# Patient Record
Sex: FEMALE | Race: WHITE | NOT HISPANIC OR LATINO | Employment: OTHER | URBAN - METROPOLITAN AREA
[De-identification: names, ages, dates, MRNs, and addresses within clinical notes are randomized per-mention and may not be internally consistent; named-entity substitution may affect disease eponyms.]

---

## 2023-05-19 ENCOUNTER — APPOINTMENT (OUTPATIENT)
Dept: PRIMARY CARE | Facility: CLINIC | Age: 58
End: 2023-05-19
Payer: COMMERCIAL

## 2023-07-13 ENCOUNTER — APPOINTMENT (OUTPATIENT)
Dept: PRIMARY CARE | Facility: CLINIC | Age: 58
End: 2023-07-13
Payer: COMMERCIAL

## 2023-07-17 RX ORDER — SIMVASTATIN 20 MG/1
TABLET, FILM COATED ORAL
Qty: 90 TABLET | Refills: 0 | OUTPATIENT
Start: 2023-07-17

## 2023-08-04 LAB
ALANINE AMINOTRANSFERASE (SGPT) (U/L) IN SER/PLAS: 13 U/L (ref 7–45)
ALBUMIN (G/DL) IN SER/PLAS: 4.6 G/DL (ref 3.4–5)
ALKALINE PHOSPHATASE (U/L) IN SER/PLAS: 55 U/L (ref 33–110)
ANION GAP IN SER/PLAS: 10 MMOL/L (ref 10–20)
ASPARTATE AMINOTRANSFERASE (SGOT) (U/L) IN SER/PLAS: 15 U/L (ref 9–39)
BILIRUBIN TOTAL (MG/DL) IN SER/PLAS: 0.8 MG/DL (ref 0–1.2)
CALCIUM (MG/DL) IN SER/PLAS: 9.4 MG/DL (ref 8.6–10.3)
CARBON DIOXIDE, TOTAL (MMOL/L) IN SER/PLAS: 30 MMOL/L (ref 21–32)
CHLORIDE (MMOL/L) IN SER/PLAS: 106 MMOL/L (ref 98–107)
CHOLESTEROL (MG/DL) IN SER/PLAS: 160 MG/DL (ref 0–199)
CHOLESTEROL IN HDL (MG/DL) IN SER/PLAS: 55.5 MG/DL
CHOLESTEROL/HDL RATIO: 2.9
CREATININE (MG/DL) IN SER/PLAS: 0.72 MG/DL (ref 0.5–1.05)
ESTIMATED AVERAGE GLUCOSE FOR HBA1C: 128 MG/DL
GFR FEMALE: >90 ML/MIN/1.73M2
GLUCOSE (MG/DL) IN SER/PLAS: 96 MG/DL (ref 74–99)
HEMOGLOBIN A1C/HEMOGLOBIN TOTAL IN BLOOD: 6.1 %
LDL: 76 MG/DL (ref 0–99)
POTASSIUM (MMOL/L) IN SER/PLAS: 4.5 MMOL/L (ref 3.5–5.3)
PROTEIN TOTAL: 7.4 G/DL (ref 6.4–8.2)
SODIUM (MMOL/L) IN SER/PLAS: 141 MMOL/L (ref 136–145)
TRIGLYCERIDE (MG/DL) IN SER/PLAS: 144 MG/DL (ref 0–149)
UREA NITROGEN (MG/DL) IN SER/PLAS: 22 MG/DL (ref 6–23)
VLDL: 29 MG/DL (ref 0–40)

## 2023-08-11 ENCOUNTER — OFFICE VISIT (OUTPATIENT)
Dept: PRIMARY CARE | Facility: CLINIC | Age: 58
End: 2023-08-11
Payer: COMMERCIAL

## 2023-08-11 VITALS
SYSTOLIC BLOOD PRESSURE: 146 MMHG | DIASTOLIC BLOOD PRESSURE: 84 MMHG | OXYGEN SATURATION: 99 % | WEIGHT: 174 LBS | HEART RATE: 63 BPM | TEMPERATURE: 97.3 F

## 2023-08-11 DIAGNOSIS — R73.01 IFG (IMPAIRED FASTING GLUCOSE): ICD-10-CM

## 2023-08-11 DIAGNOSIS — I10 BENIGN ESSENTIAL HTN: ICD-10-CM

## 2023-08-11 DIAGNOSIS — J30.9 ALLERGIC RHINITIS, UNSPECIFIED SEASONALITY, UNSPECIFIED TRIGGER: ICD-10-CM

## 2023-08-11 DIAGNOSIS — E78.5 HYPERLIPIDEMIA, UNSPECIFIED HYPERLIPIDEMIA TYPE: Primary | ICD-10-CM

## 2023-08-11 DIAGNOSIS — K21.9 GASTROESOPHAGEAL REFLUX DISEASE WITHOUT ESOPHAGITIS: ICD-10-CM

## 2023-08-11 PROCEDURE — 99214 OFFICE O/P EST MOD 30 MIN: CPT | Performed by: PHYSICIAN ASSISTANT

## 2023-08-11 PROCEDURE — 3077F SYST BP >= 140 MM HG: CPT | Performed by: PHYSICIAN ASSISTANT

## 2023-08-11 PROCEDURE — 3079F DIAST BP 80-89 MM HG: CPT | Performed by: PHYSICIAN ASSISTANT

## 2023-08-11 PROCEDURE — 1036F TOBACCO NON-USER: CPT | Performed by: PHYSICIAN ASSISTANT

## 2023-08-11 RX ORDER — ATENOLOL 50 MG/1
50 TABLET ORAL DAILY
Qty: 90 TABLET | Refills: 1 | Status: CANCELLED | OUTPATIENT
Start: 2023-08-11

## 2023-08-11 RX ORDER — FLUTICASONE PROPIONATE 50 MCG
2 SPRAY, SUSPENSION (ML) NASAL DAILY
Qty: 48 G | Refills: 1 | Status: SHIPPED | OUTPATIENT
Start: 2023-08-11 | End: 2023-12-08 | Stop reason: SDUPTHER

## 2023-08-11 RX ORDER — SIMVASTATIN 20 MG/1
20 TABLET, FILM COATED ORAL NIGHTLY
Qty: 90 TABLET | Refills: 1 | Status: SHIPPED | OUTPATIENT
Start: 2023-08-11 | End: 2023-12-08 | Stop reason: SDUPTHER

## 2023-08-11 RX ORDER — FLUTICASONE PROPIONATE 50 MCG
2 SPRAY, SUSPENSION (ML) NASAL DAILY
COMMUNITY
End: 2023-08-11 | Stop reason: SDUPTHER

## 2023-08-11 RX ORDER — SIMVASTATIN 20 MG/1
20 TABLET, FILM COATED ORAL NIGHTLY
COMMUNITY
End: 2023-08-11 | Stop reason: SDUPTHER

## 2023-08-11 RX ORDER — ATENOLOL 50 MG/1
50 TABLET ORAL DAILY
COMMUNITY
End: 2023-08-11 | Stop reason: DRUGHIGH

## 2023-08-11 RX ORDER — MINERAL OIL
180 ENEMA (ML) RECTAL DAILY
COMMUNITY
Start: 2007-06-26

## 2023-08-11 RX ORDER — PANTOPRAZOLE SODIUM 40 MG/1
40 TABLET, DELAYED RELEASE ORAL
Qty: 90 TABLET | Refills: 1 | Status: SHIPPED | OUTPATIENT
Start: 2023-08-11 | End: 2023-12-08 | Stop reason: SDUPTHER

## 2023-08-11 RX ORDER — ATENOLOL 100 MG/1
100 TABLET ORAL DAILY
Qty: 30 TABLET | Refills: 5 | Status: SHIPPED | OUTPATIENT
Start: 2023-08-11 | End: 2023-08-28 | Stop reason: SDUPTHER

## 2023-08-11 RX ORDER — PANTOPRAZOLE SODIUM 40 MG/1
40 TABLET, DELAYED RELEASE ORAL
COMMUNITY
End: 2023-08-11 | Stop reason: SDUPTHER

## 2023-08-11 ASSESSMENT — ENCOUNTER SYMPTOMS
SHORTNESS OF BREATH: 0
ABDOMINAL PAIN: 0

## 2023-08-11 NOTE — PROGRESS NOTES
Subjective   Patient ID: Sunita Austin is a 57 y.o. female who presents for Hypertension (Recheck. Review labs.).    HPI   Patient presents for hypertension and hyperlipidemia, allergies, and GERD.     Hyperlipidemia:  Taking simvastatin qd and fish oil qd. No medication side effects noted.   LDL (mg/dL)   Date Value   08/04/2023 76   12/21/2022 100 (H)     Triglycerides (mg/dL)   Date Value   08/04/2023 144   12/21/2022 140     HDL (mg/dL)   Date Value   08/04/2023 55.5   12/21/2022 62.4     Hypertension:  Taking atenolol 50mg qd and tolerating it well. B  Done fair with diet.  BPs have been running  a little higher.     Allergies doing ok with use of Flonase and Allegra.    IFG:  A1c allison to 6.1. Eating more crackers and more pasta.   Hemoglobin A1C (%)   Date Value   08/04/2023 6.1 (A)   12/21/2022 5.8 (A)     Glucose (mg/dL)   Date Value   08/04/2023 96   12/21/2022 100 (H)     GERD:  Protonix is controlling acid reflux well. No breakthrough reflux or abdominal pain.     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      reports that she has never smoked. She has never used smokeless tobacco.      Review of Systems   Respiratory:  Negative for shortness of breath.    Cardiovascular:  Negative for chest pain.   Gastrointestinal:  Negative for abdominal pain.       Objective   /84   Pulse 63   Temp 36.3 °C (97.3 °F)   Wt 78.9 kg (174 lb)   SpO2 99%     Physical Exam  Vitals and nursing note reviewed.   Constitutional:       Appearance: Normal appearance. She is well-developed.   Eyes:      General: No scleral icterus.  Cardiovascular:      Rate and Rhythm: Normal rate and regular rhythm.      Heart sounds: No murmur heard.  Pulmonary:      Effort: Pulmonary effort is normal.      Breath sounds: Normal breath sounds.   Abdominal:      Palpations: Abdomen is soft. There is no mass.      Tenderness: There is no abdominal tenderness.   Musculoskeletal:      Cervical back: Neck supple.   Skin:     General:  Skin is warm and dry.   Neurological:      Mental Status: She is alert.       Assessment/Plan   Diagnoses and all orders for this visit:  Hyperlipidemia, unspecified hyperlipidemia type  IFG (impaired fasting glucose)  Benign essential HTN  Gastroesophageal reflux disease without esophagitis  Allergic rhinitis, unspecified seasonality, unspecified trigger     Reviewed labs.   Discussed diet and exercise, and encouraged weight loss.   Refilled meds.   Discussed uncontrolled HTN.  Increase to Atenolol 100mg every day. Monitor pulse.   Follow up in 4 months for recheck HTN, hyperlipidemia, IFG, GERD with fasting labs the week before.

## 2023-08-28 ENCOUNTER — TELEPHONE (OUTPATIENT)
Dept: PRIMARY CARE | Facility: CLINIC | Age: 58
End: 2023-08-28
Payer: COMMERCIAL

## 2023-08-28 DIAGNOSIS — I10 BENIGN ESSENTIAL HTN: Primary | ICD-10-CM

## 2023-08-28 RX ORDER — LISINOPRIL 10 MG/1
10 TABLET ORAL DAILY
Qty: 30 TABLET | Refills: 3 | Status: SHIPPED | OUTPATIENT
Start: 2023-08-28 | End: 2023-12-08 | Stop reason: DRUGHIGH

## 2023-08-28 RX ORDER — ATENOLOL 100 MG/1
50 TABLET ORAL DAILY
Qty: 15 TABLET | Refills: 5
Start: 2023-08-28 | End: 2023-12-08 | Stop reason: DRUGHIGH

## 2023-08-28 NOTE — TELEPHONE ENCOUNTER
Pt started atenolol on 8/11 and pts BP's have not really improved, still ranging from 160-191... pt also states her pulse is in the 50's.    Please advise.  Next OV 12/8

## 2023-12-07 ENCOUNTER — LAB (OUTPATIENT)
Dept: LAB | Facility: LAB | Age: 58
End: 2023-12-07
Payer: COMMERCIAL

## 2023-12-07 DIAGNOSIS — E78.5 HYPERLIPIDEMIA, UNSPECIFIED HYPERLIPIDEMIA TYPE: ICD-10-CM

## 2023-12-07 DIAGNOSIS — I10 BENIGN ESSENTIAL HTN: ICD-10-CM

## 2023-12-07 DIAGNOSIS — R73.01 IFG (IMPAIRED FASTING GLUCOSE): ICD-10-CM

## 2023-12-07 LAB
ALBUMIN SERPL BCP-MCNC: 4.3 G/DL (ref 3.4–5)
ALP SERPL-CCNC: 57 U/L (ref 33–110)
ALT SERPL W P-5'-P-CCNC: 18 U/L (ref 7–45)
ANION GAP SERPL CALC-SCNC: 10 MMOL/L (ref 10–20)
AST SERPL W P-5'-P-CCNC: 20 U/L (ref 9–39)
BILIRUB SERPL-MCNC: 0.7 MG/DL (ref 0–1.2)
BUN SERPL-MCNC: 18 MG/DL (ref 6–23)
CALCIUM SERPL-MCNC: 8.9 MG/DL (ref 8.6–10.3)
CHLORIDE SERPL-SCNC: 103 MMOL/L (ref 98–107)
CHOLEST SERPL-MCNC: 181 MG/DL (ref 0–199)
CHOLESTEROL/HDL RATIO: 3
CO2 SERPL-SCNC: 30 MMOL/L (ref 21–32)
CREAT SERPL-MCNC: 0.7 MG/DL (ref 0.5–1.05)
EST. AVERAGE GLUCOSE BLD GHB EST-MCNC: 120 MG/DL
GFR SERPL CREATININE-BSD FRML MDRD: >90 ML/MIN/1.73M*2
GLUCOSE SERPL-MCNC: 96 MG/DL (ref 74–99)
HBA1C MFR BLD: 5.8 %
HDLC SERPL-MCNC: 59.4 MG/DL
LDLC SERPL CALC-MCNC: 96 MG/DL
NON HDL CHOLESTEROL: 122 MG/DL (ref 0–149)
POTASSIUM SERPL-SCNC: 5 MMOL/L (ref 3.5–5.3)
PROT SERPL-MCNC: 6.7 G/DL (ref 6.4–8.2)
SODIUM SERPL-SCNC: 138 MMOL/L (ref 136–145)
TRIGL SERPL-MCNC: 130 MG/DL (ref 0–149)
VLDL: 26 MG/DL (ref 0–40)

## 2023-12-07 PROCEDURE — 36415 COLL VENOUS BLD VENIPUNCTURE: CPT

## 2023-12-07 PROCEDURE — 83036 HEMOGLOBIN GLYCOSYLATED A1C: CPT

## 2023-12-07 PROCEDURE — 80061 LIPID PANEL: CPT

## 2023-12-07 PROCEDURE — 80053 COMPREHEN METABOLIC PANEL: CPT

## 2023-12-08 ENCOUNTER — OFFICE VISIT (OUTPATIENT)
Dept: PRIMARY CARE | Facility: CLINIC | Age: 58
End: 2023-12-08
Payer: COMMERCIAL

## 2023-12-08 VITALS
WEIGHT: 177 LBS | OXYGEN SATURATION: 100 % | TEMPERATURE: 97.6 F | SYSTOLIC BLOOD PRESSURE: 152 MMHG | HEART RATE: 75 BPM | DIASTOLIC BLOOD PRESSURE: 90 MMHG

## 2023-12-08 DIAGNOSIS — R73.01 IFG (IMPAIRED FASTING GLUCOSE): ICD-10-CM

## 2023-12-08 DIAGNOSIS — I10 BENIGN ESSENTIAL HTN: ICD-10-CM

## 2023-12-08 DIAGNOSIS — K21.9 GASTROESOPHAGEAL REFLUX DISEASE WITHOUT ESOPHAGITIS: ICD-10-CM

## 2023-12-08 DIAGNOSIS — J30.9 ALLERGIC RHINITIS, UNSPECIFIED SEASONALITY, UNSPECIFIED TRIGGER: ICD-10-CM

## 2023-12-08 DIAGNOSIS — E78.5 HYPERLIPIDEMIA, UNSPECIFIED HYPERLIPIDEMIA TYPE: Primary | ICD-10-CM

## 2023-12-08 PROCEDURE — 3077F SYST BP >= 140 MM HG: CPT | Performed by: PHYSICIAN ASSISTANT

## 2023-12-08 PROCEDURE — 3080F DIAST BP >= 90 MM HG: CPT | Performed by: PHYSICIAN ASSISTANT

## 2023-12-08 PROCEDURE — 99214 OFFICE O/P EST MOD 30 MIN: CPT | Performed by: PHYSICIAN ASSISTANT

## 2023-12-08 PROCEDURE — 1036F TOBACCO NON-USER: CPT | Performed by: PHYSICIAN ASSISTANT

## 2023-12-08 RX ORDER — LISINOPRIL 10 MG/1
10 TABLET ORAL DAILY
Qty: 90 TABLET | Refills: 1 | Status: CANCELLED | OUTPATIENT
Start: 2023-12-08

## 2023-12-08 RX ORDER — ATENOLOL 100 MG/1
100 TABLET ORAL DAILY
Qty: 90 TABLET | Refills: 1 | Status: CANCELLED | OUTPATIENT
Start: 2023-12-08

## 2023-12-08 RX ORDER — PANTOPRAZOLE SODIUM 40 MG/1
40 TABLET, DELAYED RELEASE ORAL
Qty: 90 TABLET | Refills: 1 | Status: SHIPPED | OUTPATIENT
Start: 2023-12-08 | End: 2024-04-05 | Stop reason: SDUPTHER

## 2023-12-08 RX ORDER — FLUTICASONE PROPIONATE 50 MCG
2 SPRAY, SUSPENSION (ML) NASAL DAILY
Qty: 48 G | Refills: 1 | Status: SHIPPED | OUTPATIENT
Start: 2023-12-08 | End: 2024-04-05 | Stop reason: SDUPTHER

## 2023-12-08 RX ORDER — SIMVASTATIN 20 MG/1
20 TABLET, FILM COATED ORAL NIGHTLY
Qty: 90 TABLET | Refills: 1 | Status: SHIPPED | OUTPATIENT
Start: 2023-12-08 | End: 2024-04-05 | Stop reason: SDUPTHER

## 2023-12-08 RX ORDER — LISINOPRIL 20 MG/1
20 TABLET ORAL DAILY
Qty: 90 TABLET | Refills: 1 | Status: SHIPPED | OUTPATIENT
Start: 2023-12-08 | End: 2024-04-05 | Stop reason: SDUPTHER

## 2023-12-08 RX ORDER — ATENOLOL 50 MG/1
50 TABLET ORAL DAILY
Qty: 90 TABLET | Refills: 1 | Status: SHIPPED | OUTPATIENT
Start: 2023-12-08 | End: 2024-04-05 | Stop reason: SDUPTHER

## 2023-12-08 ASSESSMENT — ENCOUNTER SYMPTOMS
ABDOMINAL PAIN: 0
SHORTNESS OF BREATH: 0

## 2023-12-08 NOTE — PROGRESS NOTES
Subjective   Patient ID: Sunita Austin is a 58 y.o. female who presents for Hypertension, Hyperlipidemia, and GERD (Recheck. Review labs.).    HPI   Patient presents for hypertension and hyperlipidemia, allergies, and GERD.     Hyperlipidemia:  controlled Taking simvastatin qd and fish oil qd. No medication side effects noted.   LDL Calculated (mg/dL)   Date Value   12/07/2023 96     LDL (mg/dL)   Date Value   08/04/2023 76   12/21/2022 100 (H)     Triglycerides (mg/dL)   Date Value   12/07/2023 130   08/04/2023 144   12/21/2022 140     HDL-Cholesterol (mg/dL)   Date Value   12/07/2023 59.4     HDL (mg/dL)   Date Value   08/04/2023 55.5   12/21/2022 62.4     Hypertension:  Taking atenolol 50mg and lisinopril 10mg qd and tolerating well. BP has been higher -- 140's-160's/60-90's. Pulses have been better though. More sodium in diet lately. Gained wt.     Allergies doing ok with use of Flonase and Allegra.    IFG:  A1c improved to 5.8.  Cut down on carbs in det.     Hemoglobin A1C (%)   Date Value   12/07/2023 5.8 (H)   08/04/2023 6.1 (A)   12/21/2022 5.8 (A)     Glucose (mg/dL)   Date Value   12/07/2023 96   08/04/2023 96   12/21/2022 100 (H)     GERD:  Taking and tolerating Protonix. Getting a little breakthrough reflux and belching recently.  Was taking naproxen for a few weeks the past few months but stopped that recently.     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  Some added stress figuring out what to do next.      reports that she has never smoked. She has never used smokeless tobacco.      Review of Systems   Respiratory:  Negative for shortness of breath.    Cardiovascular:  Negative for chest pain.   Gastrointestinal:  Negative for abdominal pain.       Objective   /90   Pulse 75   Temp 36.4 °C (97.6 °F)   Wt 80.3 kg (177 lb)   SpO2 100%     Physical Exam  Vitals and nursing note reviewed.   Constitutional:        Appearance: Normal appearance. She is well-developed.   Eyes:      General: No scleral icterus.  Cardiovascular:      Rate and Rhythm: Normal rate and regular rhythm.      Heart sounds: No murmur heard.  Pulmonary:      Effort: Pulmonary effort is normal.      Breath sounds: Normal breath sounds.   Abdominal:      Palpations: Abdomen is soft. There is no mass.      Tenderness: There is no abdominal tenderness.   Musculoskeletal:      Cervical back: Neck supple.   Skin:     General: Skin is warm and dry.   Neurological:      Mental Status: She is alert.       Assessment/Plan   Diagnoses and all orders for this visit:  Hyperlipidemia, unspecified hyperlipidemia type  IFG (impaired fasting glucose)  Benign essential HTN  Gastroesophageal reflux disease without esophagitis  Allergic rhinitis, unspecified seasonality, unspecified trigger     Reviewed labs.   Discussed diet and exercise, and encouraged weight loss.   Refilled meds.   Increase to Rx Lisinopril 20mg and continue Atenolol 50mg every day.   Can add some OTC Famotidine 20mg every day in addition to her pantoprazole for a few weeks. If reflux persists, follow up.  Avoid NSAID use.  Follow up in 4 months for recheck HTN, hyperlipidemia, IFG, GERD with fasting labs the week before.

## 2024-04-01 ENCOUNTER — LAB (OUTPATIENT)
Dept: LAB | Facility: LAB | Age: 59
End: 2024-04-01
Payer: COMMERCIAL

## 2024-04-01 DIAGNOSIS — E78.5 HYPERLIPIDEMIA, UNSPECIFIED HYPERLIPIDEMIA TYPE: ICD-10-CM

## 2024-04-01 DIAGNOSIS — R73.01 IFG (IMPAIRED FASTING GLUCOSE): ICD-10-CM

## 2024-04-01 DIAGNOSIS — I10 BENIGN ESSENTIAL HTN: ICD-10-CM

## 2024-04-01 LAB
ALBUMIN SERPL BCP-MCNC: 4.4 G/DL (ref 3.4–5)
ALP SERPL-CCNC: 66 U/L (ref 33–110)
ALT SERPL W P-5'-P-CCNC: 21 U/L (ref 7–45)
ANION GAP SERPL CALC-SCNC: 11 MMOL/L (ref 10–20)
AST SERPL W P-5'-P-CCNC: 21 U/L (ref 9–39)
BILIRUB SERPL-MCNC: 0.9 MG/DL (ref 0–1.2)
BUN SERPL-MCNC: 15 MG/DL (ref 6–23)
CALCIUM SERPL-MCNC: 9.3 MG/DL (ref 8.6–10.3)
CHLORIDE SERPL-SCNC: 103 MMOL/L (ref 98–107)
CHOLEST SERPL-MCNC: 194 MG/DL (ref 0–199)
CHOLESTEROL/HDL RATIO: 3.5
CO2 SERPL-SCNC: 31 MMOL/L (ref 21–32)
CREAT SERPL-MCNC: 0.76 MG/DL (ref 0.5–1.05)
EGFRCR SERPLBLD CKD-EPI 2021: >90 ML/MIN/1.73M*2
EST. AVERAGE GLUCOSE BLD GHB EST-MCNC: 128 MG/DL
GLUCOSE SERPL-MCNC: 103 MG/DL (ref 74–99)
HBA1C MFR BLD: 6.1 %
HDLC SERPL-MCNC: 55.9 MG/DL
LDLC SERPL CALC-MCNC: 94 MG/DL
NON HDL CHOLESTEROL: 138 MG/DL (ref 0–149)
POTASSIUM SERPL-SCNC: 5.1 MMOL/L (ref 3.5–5.3)
PROT SERPL-MCNC: 7 G/DL (ref 6.4–8.2)
SODIUM SERPL-SCNC: 140 MMOL/L (ref 136–145)
TRIGL SERPL-MCNC: 219 MG/DL (ref 0–149)
VLDL: 44 MG/DL (ref 0–40)

## 2024-04-01 PROCEDURE — 36415 COLL VENOUS BLD VENIPUNCTURE: CPT

## 2024-04-01 PROCEDURE — 80061 LIPID PANEL: CPT

## 2024-04-01 PROCEDURE — 83036 HEMOGLOBIN GLYCOSYLATED A1C: CPT

## 2024-04-01 PROCEDURE — 80053 COMPREHEN METABOLIC PANEL: CPT

## 2024-04-05 ENCOUNTER — OFFICE VISIT (OUTPATIENT)
Dept: PRIMARY CARE | Facility: CLINIC | Age: 59
End: 2024-04-05
Payer: COMMERCIAL

## 2024-04-05 VITALS
WEIGHT: 180 LBS | TEMPERATURE: 97.4 F | DIASTOLIC BLOOD PRESSURE: 74 MMHG | HEART RATE: 66 BPM | SYSTOLIC BLOOD PRESSURE: 122 MMHG | OXYGEN SATURATION: 98 %

## 2024-04-05 DIAGNOSIS — Z12.31 VISIT FOR SCREENING MAMMOGRAM: ICD-10-CM

## 2024-04-05 DIAGNOSIS — K21.9 GASTROESOPHAGEAL REFLUX DISEASE WITHOUT ESOPHAGITIS: ICD-10-CM

## 2024-04-05 DIAGNOSIS — R73.01 IFG (IMPAIRED FASTING GLUCOSE): ICD-10-CM

## 2024-04-05 DIAGNOSIS — J30.9 ALLERGIC RHINITIS, UNSPECIFIED SEASONALITY, UNSPECIFIED TRIGGER: ICD-10-CM

## 2024-04-05 DIAGNOSIS — E78.5 HYPERLIPIDEMIA, UNSPECIFIED HYPERLIPIDEMIA TYPE: Primary | ICD-10-CM

## 2024-04-05 DIAGNOSIS — I10 BENIGN ESSENTIAL HTN: ICD-10-CM

## 2024-04-05 PROCEDURE — 1036F TOBACCO NON-USER: CPT | Performed by: PHYSICIAN ASSISTANT

## 2024-04-05 PROCEDURE — 3074F SYST BP LT 130 MM HG: CPT | Performed by: PHYSICIAN ASSISTANT

## 2024-04-05 PROCEDURE — 99214 OFFICE O/P EST MOD 30 MIN: CPT | Performed by: PHYSICIAN ASSISTANT

## 2024-04-05 PROCEDURE — 3078F DIAST BP <80 MM HG: CPT | Performed by: PHYSICIAN ASSISTANT

## 2024-04-05 RX ORDER — PANTOPRAZOLE SODIUM 40 MG/1
40 TABLET, DELAYED RELEASE ORAL
Qty: 90 TABLET | Refills: 1 | Status: SHIPPED | OUTPATIENT
Start: 2024-04-05

## 2024-04-05 RX ORDER — ATENOLOL 50 MG/1
50 TABLET ORAL DAILY
Qty: 90 TABLET | Refills: 1 | Status: SHIPPED | OUTPATIENT
Start: 2024-04-05

## 2024-04-05 RX ORDER — SIMVASTATIN 20 MG/1
20 TABLET, FILM COATED ORAL NIGHTLY
Qty: 90 TABLET | Refills: 1 | Status: SHIPPED | OUTPATIENT
Start: 2024-04-05

## 2024-04-05 RX ORDER — LISINOPRIL 20 MG/1
20 TABLET ORAL DAILY
Qty: 90 TABLET | Refills: 1 | Status: SHIPPED | OUTPATIENT
Start: 2024-04-05

## 2024-04-05 RX ORDER — FLUTICASONE PROPIONATE 50 MCG
2 SPRAY, SUSPENSION (ML) NASAL DAILY
Qty: 48 G | Refills: 1 | Status: SHIPPED | OUTPATIENT
Start: 2024-04-05

## 2024-04-05 ASSESSMENT — ENCOUNTER SYMPTOMS
ABDOMINAL PAIN: 0
SHORTNESS OF BREATH: 0

## 2024-04-05 NOTE — PROGRESS NOTES
Subjective   Patient ID: Sunita Austin is a 58 y.o. female who presents for Hypertension, Hyperlipidemia, and GERD (Recheck, review labs.).    HPI   Patient presents for hypertension and hyperlipidemia, allergies, and GERD.     Hyperlipidemia:  fairly well controlled.  Taking and tolerating simvastatin qd and fish oil every day.  LDL Calculated (mg/dL)   Date Value   04/01/2024 94   12/07/2023 96     LDL (mg/dL)   Date Value   08/04/2023 76   12/21/2022 100 (H)     Triglycerides (mg/dL)   Date Value   04/01/2024 219 (H)   12/07/2023 130     HDL-Cholesterol (mg/dL)   Date Value   04/01/2024 55.9   12/07/2023 59.4     Hypertension:  Taking atenolol 50mg and lisinopril 20mg qd and tolerating well. BP has been better since last visit.      Allergies doing ok with use of Flonase and Allegra.    IFG:  A1c allison to 6.1. Eating more carbs lately.   Less active this winter but recently started some walking exercise.   Hemoglobin A1C (%)   Date Value   04/01/2024 6.1 (H)   12/07/2023 5.8 (H)     Glucose (mg/dL)   Date Value   04/01/2024 103 (H)   12/07/2023 96     GERD:  Taking and tolerating Protonix. Reflux controlled well.     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 Going to be watching her grandson soon.      reports that she has never smoked. She has never used smokeless tobacco.      Review of Systems   Respiratory:  Negative for shortness of breath.    Cardiovascular:  Negative for chest pain.   Gastrointestinal:  Negative for abdominal pain.       Objective   /74   Pulse 66   Temp 36.3 °C (97.4 °F)   Wt 81.6 kg (180 lb)   SpO2 98%     Physical Exam  Vitals and nursing note reviewed.   Constitutional:       Appearance: Normal appearance. She is well-developed.   Eyes:      General: No scleral icterus.  Cardiovascular:      Rate and Rhythm: Normal  rate and regular rhythm.      Heart sounds: No murmur heard.  Pulmonary:      Effort: Pulmonary effort is normal.      Breath sounds: Normal breath sounds.   Abdominal:      Palpations: Abdomen is soft. There is no mass.      Tenderness: There is no abdominal tenderness.   Musculoskeletal:      Cervical back: Neck supple.   Skin:     General: Skin is warm and dry.   Neurological:      Mental Status: She is alert.       Assessment/Plan   Diagnoses and all orders for this visit:  Hyperlipidemia, unspecified hyperlipidemia type  -     simvastatin (Zocor) 20 mg tablet; Take 1 tablet (20 mg) by mouth once daily at bedtime.  -     Comprehensive Metabolic Panel; Future  -     Lipid Panel; Future  IFG (impaired fasting glucose)  -     Comprehensive Metabolic Panel; Future  -     Hemoglobin A1C; Future  Benign essential HTN  -     atenolol (Tenormin) 50 mg tablet; Take 1 tablet (50 mg) by mouth once daily.  -     lisinopril 20 mg tablet; Take 1 tablet (20 mg) by mouth once daily.  -     Comprehensive Metabolic Panel; Future  Gastroesophageal reflux disease without esophagitis  -     pantoprazole (ProtoNix) 40 mg EC tablet; Take 1 tablet (40 mg) by mouth once daily in the morning. Take before meals.  Allergic rhinitis, unspecified seasonality, unspecified trigger  -     fluticasone (Flonase) 50 mcg/actuation nasal spray; Administer 2 sprays into each nostril once daily.  Visit for screening mammogram  -     BI mammo bilateral screening; Future     Reviewed labs.   Discussed diet and exercise, and encouraged weight loss.   Refilled meds.   Mammogram ordered.   Follow up in 4 months for recheck HTN, hyperlipidemia, IFG, GERD with fasting labs the week before.

## 2024-08-02 ENCOUNTER — LAB (OUTPATIENT)
Dept: LAB | Facility: LAB | Age: 59
End: 2024-08-02
Payer: COMMERCIAL

## 2024-08-02 ENCOUNTER — APPOINTMENT (OUTPATIENT)
Dept: PRIMARY CARE | Facility: CLINIC | Age: 59
End: 2024-08-02
Payer: COMMERCIAL

## 2024-08-02 VITALS
OXYGEN SATURATION: 99 % | HEART RATE: 65 BPM | TEMPERATURE: 97.4 F | SYSTOLIC BLOOD PRESSURE: 130 MMHG | WEIGHT: 174 LBS | DIASTOLIC BLOOD PRESSURE: 82 MMHG

## 2024-08-02 DIAGNOSIS — J30.9 ALLERGIC RHINITIS, UNSPECIFIED SEASONALITY, UNSPECIFIED TRIGGER: ICD-10-CM

## 2024-08-02 DIAGNOSIS — I10 BENIGN ESSENTIAL HTN: ICD-10-CM

## 2024-08-02 DIAGNOSIS — E78.5 HYPERLIPIDEMIA, UNSPECIFIED HYPERLIPIDEMIA TYPE: Primary | ICD-10-CM

## 2024-08-02 DIAGNOSIS — R73.01 IFG (IMPAIRED FASTING GLUCOSE): ICD-10-CM

## 2024-08-02 DIAGNOSIS — K21.9 GASTROESOPHAGEAL REFLUX DISEASE WITHOUT ESOPHAGITIS: ICD-10-CM

## 2024-08-02 DIAGNOSIS — E78.5 HYPERLIPIDEMIA, UNSPECIFIED HYPERLIPIDEMIA TYPE: ICD-10-CM

## 2024-08-02 DIAGNOSIS — Z12.11 COLON CANCER SCREENING: ICD-10-CM

## 2024-08-02 LAB
ALBUMIN SERPL BCP-MCNC: 4.6 G/DL (ref 3.4–5)
ALP SERPL-CCNC: 68 U/L (ref 33–110)
ALT SERPL W P-5'-P-CCNC: 19 U/L (ref 7–45)
ANION GAP SERPL CALC-SCNC: 11 MMOL/L (ref 10–20)
AST SERPL W P-5'-P-CCNC: 20 U/L (ref 9–39)
BILIRUB SERPL-MCNC: 0.7 MG/DL (ref 0–1.2)
BUN SERPL-MCNC: 15 MG/DL (ref 6–23)
CALCIUM SERPL-MCNC: 9.4 MG/DL (ref 8.6–10.3)
CHLORIDE SERPL-SCNC: 103 MMOL/L (ref 98–107)
CHOLEST SERPL-MCNC: 173 MG/DL (ref 0–199)
CHOLESTEROL/HDL RATIO: 3.3
CO2 SERPL-SCNC: 30 MMOL/L (ref 21–32)
CREAT SERPL-MCNC: 0.73 MG/DL (ref 0.5–1.05)
EGFRCR SERPLBLD CKD-EPI 2021: >90 ML/MIN/1.73M*2
EST. AVERAGE GLUCOSE BLD GHB EST-MCNC: 126 MG/DL
GLUCOSE SERPL-MCNC: 103 MG/DL (ref 74–99)
HBA1C MFR BLD: 6 %
HDLC SERPL-MCNC: 52.7 MG/DL
LDLC SERPL CALC-MCNC: 84 MG/DL
NON HDL CHOLESTEROL: 120 MG/DL (ref 0–149)
POTASSIUM SERPL-SCNC: 5.1 MMOL/L (ref 3.5–5.3)
PROT SERPL-MCNC: 7.2 G/DL (ref 6.4–8.2)
SODIUM SERPL-SCNC: 139 MMOL/L (ref 136–145)
TRIGL SERPL-MCNC: 182 MG/DL (ref 0–149)
VLDL: 36 MG/DL (ref 0–40)

## 2024-08-02 PROCEDURE — 83036 HEMOGLOBIN GLYCOSYLATED A1C: CPT

## 2024-08-02 PROCEDURE — 80061 LIPID PANEL: CPT

## 2024-08-02 PROCEDURE — 36415 COLL VENOUS BLD VENIPUNCTURE: CPT

## 2024-08-02 PROCEDURE — 3075F SYST BP GE 130 - 139MM HG: CPT | Performed by: PHYSICIAN ASSISTANT

## 2024-08-02 PROCEDURE — 3079F DIAST BP 80-89 MM HG: CPT | Performed by: PHYSICIAN ASSISTANT

## 2024-08-02 PROCEDURE — 1036F TOBACCO NON-USER: CPT | Performed by: PHYSICIAN ASSISTANT

## 2024-08-02 PROCEDURE — 99214 OFFICE O/P EST MOD 30 MIN: CPT | Performed by: PHYSICIAN ASSISTANT

## 2024-08-02 PROCEDURE — 80053 COMPREHEN METABOLIC PANEL: CPT

## 2024-08-02 RX ORDER — LISINOPRIL 20 MG/1
20 TABLET ORAL DAILY
Qty: 90 TABLET | Refills: 1 | Status: SHIPPED | OUTPATIENT
Start: 2024-08-02

## 2024-08-02 RX ORDER — GUAIFENESIN 600 MG/1
1200 TABLET, EXTENDED RELEASE ORAL DAILY PRN
COMMUNITY

## 2024-08-02 RX ORDER — PANTOPRAZOLE SODIUM 40 MG/1
40 TABLET, DELAYED RELEASE ORAL
Qty: 90 TABLET | Refills: 1 | Status: SHIPPED | OUTPATIENT
Start: 2024-08-02

## 2024-08-02 RX ORDER — FLUTICASONE PROPIONATE 50 MCG
2 SPRAY, SUSPENSION (ML) NASAL DAILY
Qty: 48 G | Refills: 1 | Status: SHIPPED | OUTPATIENT
Start: 2024-08-02

## 2024-08-02 RX ORDER — ATENOLOL 50 MG/1
50 TABLET ORAL DAILY
Qty: 90 TABLET | Refills: 1 | Status: SHIPPED | OUTPATIENT
Start: 2024-08-02

## 2024-08-02 RX ORDER — SIMVASTATIN 20 MG/1
20 TABLET, FILM COATED ORAL NIGHTLY
Qty: 90 TABLET | Refills: 1 | Status: SHIPPED | OUTPATIENT
Start: 2024-08-02

## 2024-08-02 ASSESSMENT — ENCOUNTER SYMPTOMS
ABDOMINAL PAIN: 0
SHORTNESS OF BREATH: 0

## 2024-08-02 NOTE — PROGRESS NOTES
Subjective   Patient ID: Sunita Austin is a 58 y.o. female who presents for Hypertension (Recheck ), Hyperlipidemia, GERD, and IFG.    HPI   Patient presents for hypertension and hyperlipidemia, allergies, and GERD.     Hyperlipidemia:  fairly well controlled.  Taking and tolerating simvastatin qd and fish oil every day.  Just had labs drawn this morning prior to her visit.  Results still pending.  LDL Calculated (mg/dL)   Date Value   04/01/2024 94   12/07/2023 96     LDL (mg/dL)   Date Value   08/04/2023 76   12/21/2022 100 (H)     Triglycerides (mg/dL)   Date Value   04/01/2024 219 (H)   12/07/2023 130     HDL-Cholesterol (mg/dL)   Date Value   04/01/2024 55.9   12/07/2023 59.4     Hypertension:  Taking atenolol 50mg and lisinopril 20mg qd and tolerating well. Bps have been good.     Allergies doing ok with use of Flonase and Allegra.    IFG:  A1c allison to 6.1 on 4/1/2024 labs. Doing a little better with carb intake and has been more active. Doing some walking exercise.  Just had labs drawn this morning prior to her visit.  Results still pending.  Hemoglobin A1C (%)   Date Value   04/01/2024 6.1 (H)   12/07/2023 5.8 (H)     Glucose (mg/dL)   Date Value   04/01/2024 103 (H)   12/07/2023 96     GERD:  Taking and tolerating Protonix. Reflux controlled well.     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  Mom has been having a lot health problems so helping care for her. Going to be watching her grandson soon.      reports that she has never smoked. She has never used smokeless tobacco.      Review of Systems   Respiratory:  Negative for shortness of breath.    Cardiovascular:  Negative for chest pain.   Gastrointestinal:  Negative for abdominal pain.       Objective   /82   Pulse 65   Temp 36.3 °C (97.4 °F)   Wt 78.9 kg (174 lb)   SpO2 99%     Physical Exam  Vitals and nursing note reviewed.   Constitutional:       Appearance:  Normal appearance. She is well-developed.   Eyes:      General: No scleral icterus.  Cardiovascular:      Rate and Rhythm: Normal rate and regular rhythm.      Heart sounds: No murmur heard.  Pulmonary:      Effort: Pulmonary effort is normal.      Breath sounds: Normal breath sounds.   Abdominal:      Palpations: Abdomen is soft. There is no mass.      Tenderness: There is no abdominal tenderness.   Musculoskeletal:      Cervical back: Neck supple.   Skin:     General: Skin is warm and dry.   Neurological:      Mental Status: She is alert.       Assessment/Plan   Diagnoses and all orders for this visit:  Hyperlipidemia, unspecified hyperlipidemia type  -     simvastatin (Zocor) 20 mg tablet; Take 1 tablet (20 mg) by mouth once daily at bedtime.  -     Comprehensive Metabolic Panel; Future  -     Lipid Panel; Future  IFG (impaired fasting glucose)  -     Comprehensive Metabolic Panel; Future  -     Hemoglobin A1C; Future  Benign essential HTN  -     atenolol (Tenormin) 50 mg tablet; Take 1 tablet (50 mg) by mouth once daily.  -     lisinopril 20 mg tablet; Take 1 tablet (20 mg) by mouth once daily.  -     Comprehensive Metabolic Panel; Future  Gastroesophageal reflux disease without esophagitis  -     pantoprazole (ProtoNix) 40 mg EC tablet; Take 1 tablet (40 mg) by mouth once daily in the morning. Take before meals.  Allergic rhinitis, unspecified seasonality, unspecified trigger  -     fluticasone (Flonase) 50 mcg/actuation nasal spray; Administer 2 sprays into each nostril once daily.  Colon cancer screening  -     Cologuard® colon cancer screening; Future     Await pending lab results.   Discussed diet and exercise, and encouraged further weight loss.   Refilled meds.   Ordered Cologuard.   Reminded to do mammogram ordered last visit.    Follow up in 4 months for recheck HTN, hyperlipidemia, IFG, GERD with fasting labs the week before.

## 2024-08-22 LAB — NONINV COLON CA DNA+OCC BLD SCRN STL QL: NEGATIVE

## 2024-09-07 ENCOUNTER — HOSPITAL ENCOUNTER (OUTPATIENT)
Dept: RADIOLOGY | Facility: HOSPITAL | Age: 59
Discharge: HOME | End: 2024-09-07
Payer: COMMERCIAL

## 2024-09-07 VITALS — HEIGHT: 62 IN | BODY MASS INDEX: 31.28 KG/M2 | WEIGHT: 170 LBS

## 2024-09-07 DIAGNOSIS — Z12.31 VISIT FOR SCREENING MAMMOGRAM: ICD-10-CM

## 2024-09-07 PROCEDURE — 77063 BREAST TOMOSYNTHESIS BI: CPT

## 2024-09-07 PROCEDURE — 77067 SCR MAMMO BI INCL CAD: CPT

## 2024-09-09 DIAGNOSIS — R92.8 ABNORMAL MAMMOGRAM OF LEFT BREAST: Primary | ICD-10-CM

## 2024-09-09 DIAGNOSIS — R92.1 CALCIFICATION OF LEFT BREAST ON MAMMOGRAPHY: ICD-10-CM

## 2024-10-01 ENCOUNTER — HOSPITAL ENCOUNTER (OUTPATIENT)
Dept: RADIOLOGY | Facility: HOSPITAL | Age: 59
Discharge: HOME | End: 2024-10-01
Payer: COMMERCIAL

## 2024-10-01 ENCOUNTER — TELEPHONE (OUTPATIENT)
Dept: RADIOLOGY | Facility: HOSPITAL | Age: 59
End: 2024-10-01
Payer: COMMERCIAL

## 2024-10-01 DIAGNOSIS — R92.1 CALCIFICATION OF LEFT BREAST ON MAMMOGRAPHY: ICD-10-CM

## 2024-10-01 DIAGNOSIS — R92.8 ABNORMAL MAMMOGRAM OF LEFT BREAST: ICD-10-CM

## 2024-10-01 DIAGNOSIS — R92.8 ABNORMALITY OF LEFT BREAST ON SCREENING MAMMOGRAM: ICD-10-CM

## 2024-10-01 DIAGNOSIS — N63.20 MASS OF LEFT BREAST, UNSPECIFIED QUADRANT: Primary | ICD-10-CM

## 2024-10-01 DIAGNOSIS — N63.20 LEFT BREAST MASS: ICD-10-CM

## 2024-10-01 DIAGNOSIS — R92.8 ABNORMAL FINDINGS ON DIAGNOSTIC IMAGING OF BREAST: Primary | ICD-10-CM

## 2024-10-01 PROCEDURE — 77065 DX MAMMO INCL CAD UNI: CPT | Mod: LEFT SIDE | Performed by: RADIOLOGY

## 2024-10-01 PROCEDURE — 76642 ULTRASOUND BREAST LIMITED: CPT | Mod: LT

## 2024-10-01 PROCEDURE — 76642 ULTRASOUND BREAST LIMITED: CPT | Mod: LEFT SIDE | Performed by: RADIOLOGY

## 2024-10-01 PROCEDURE — 77061 BREAST TOMOSYNTHESIS UNI: CPT | Mod: LT

## 2024-10-01 PROCEDURE — 77061 BREAST TOMOSYNTHESIS UNI: CPT | Mod: LEFT SIDE | Performed by: RADIOLOGY

## 2024-10-01 NOTE — PROGRESS NOTES
Patient follow up scheduling:  Left breast stereotactic biopsy per provider recommendation, 10/9 1:00PM.

## 2024-10-01 NOTE — TELEPHONE ENCOUNTER
This RN Breast Care Coordinator spoke with patient to clarify visit information. Patient agreeable to keep consultation visit for 10/7 3:15PM with Dr. Perez, biopsy visit will be 10/9 1:00PM. Patient will receive apt confirmation.

## 2024-10-01 NOTE — Clinical Note
Your patient Sunita Austin seen for diagnostic breast imaging today received imaging results during her visit, and I apologize I could not get a message to you sooner. I assisted with follow up scheduling and education review today. She will see Dr. Perez for consultation 10/7, with biopsy to follow 10/9 1:00PM.

## 2024-10-01 NOTE — NURSING NOTE
After patient review of diagnostic results with Dr. Hankins, support provided. Written literature regarding abnormal breast imaging and breast biopsy including what to expect before, during, and after the procedure reviewed with the patient. All questions answered. 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 Scheduling preferences reviewed, however patient states family is supportive have encouraged her to schedule follow up as needed. Patient selected Dr. Perez for surgical consultation 10/7 3:15PM with biopsy to follow 10/9 2:15PM. Information provided and reviewed to include provider information and how to reach me directly with questions or concerns before concluding visit.

## 2024-10-09 ENCOUNTER — HOSPITAL ENCOUNTER (OUTPATIENT)
Dept: RADIOLOGY | Facility: HOSPITAL | Age: 59
Discharge: HOME | End: 2024-10-09
Payer: COMMERCIAL

## 2024-10-09 ENCOUNTER — APPOINTMENT (OUTPATIENT)
Dept: CARDIOLOGY | Facility: HOSPITAL | Age: 59
End: 2024-10-09
Payer: COMMERCIAL

## 2024-10-09 ENCOUNTER — HOSPITAL ENCOUNTER (EMERGENCY)
Facility: HOSPITAL | Age: 59
Discharge: HOME | End: 2024-10-09
Attending: STUDENT IN AN ORGANIZED HEALTH CARE EDUCATION/TRAINING PROGRAM
Payer: COMMERCIAL

## 2024-10-09 VITALS
WEIGHT: 170 LBS | HEIGHT: 62 IN | OXYGEN SATURATION: 100 % | RESPIRATION RATE: 20 BRPM | SYSTOLIC BLOOD PRESSURE: 234 MMHG | DIASTOLIC BLOOD PRESSURE: 87 MMHG | TEMPERATURE: 98 F | BODY MASS INDEX: 31.28 KG/M2 | HEART RATE: 60 BPM

## 2024-10-09 VITALS
DIASTOLIC BLOOD PRESSURE: 97 MMHG | RESPIRATION RATE: 18 BRPM | SYSTOLIC BLOOD PRESSURE: 227 MMHG | OXYGEN SATURATION: 100 %

## 2024-10-09 DIAGNOSIS — R92.8 ABNORMAL FINDINGS ON DIAGNOSTIC IMAGING OF BREAST: ICD-10-CM

## 2024-10-09 DIAGNOSIS — I10 HYPERTENSION, UNSPECIFIED TYPE: Primary | ICD-10-CM

## 2024-10-09 DIAGNOSIS — N63.20 LEFT BREAST MASS: ICD-10-CM

## 2024-10-09 LAB
ALBUMIN SERPL BCP-MCNC: 4.7 G/DL (ref 3.4–5)
ALP SERPL-CCNC: 63 U/L (ref 33–110)
ALT SERPL W P-5'-P-CCNC: 14 U/L (ref 7–45)
ANION GAP SERPL CALC-SCNC: 13 MMOL/L (ref 10–20)
AST SERPL W P-5'-P-CCNC: 19 U/L (ref 9–39)
BASOPHILS # BLD AUTO: 0.05 X10*3/UL (ref 0–0.1)
BASOPHILS NFR BLD AUTO: 0.9 %
BILIRUB SERPL-MCNC: 0.9 MG/DL (ref 0–1.2)
BUN SERPL-MCNC: 14 MG/DL (ref 6–23)
CALCIUM SERPL-MCNC: 9.6 MG/DL (ref 8.6–10.3)
CARDIAC TROPONIN I PNL SERPL HS: 4 NG/L (ref 0–13)
CHLORIDE SERPL-SCNC: 101 MMOL/L (ref 98–107)
CO2 SERPL-SCNC: 27 MMOL/L (ref 21–32)
CREAT SERPL-MCNC: 0.68 MG/DL (ref 0.5–1.05)
EGFRCR SERPLBLD CKD-EPI 2021: >90 ML/MIN/1.73M*2
EOSINOPHIL # BLD AUTO: 0.11 X10*3/UL (ref 0–0.7)
EOSINOPHIL NFR BLD AUTO: 2 %
ERYTHROCYTE [DISTWIDTH] IN BLOOD BY AUTOMATED COUNT: 13.2 % (ref 11.5–14.5)
GLUCOSE SERPL-MCNC: 90 MG/DL (ref 74–99)
HCT VFR BLD AUTO: 47.9 % (ref 36–46)
HGB BLD-MCNC: 15.4 G/DL (ref 12–16)
IMM GRANULOCYTES # BLD AUTO: 0.01 X10*3/UL (ref 0–0.7)
IMM GRANULOCYTES NFR BLD AUTO: 0.2 % (ref 0–0.9)
LYMPHOCYTES # BLD AUTO: 1.47 X10*3/UL (ref 1.2–4.8)
LYMPHOCYTES NFR BLD AUTO: 26.3 %
MCH RBC QN AUTO: 27.4 PG (ref 26–34)
MCHC RBC AUTO-ENTMCNC: 32.2 G/DL (ref 32–36)
MCV RBC AUTO: 85 FL (ref 80–100)
MONOCYTES # BLD AUTO: 0.49 X10*3/UL (ref 0.1–1)
MONOCYTES NFR BLD AUTO: 8.8 %
NEUTROPHILS # BLD AUTO: 3.45 X10*3/UL (ref 1.2–7.7)
NEUTROPHILS NFR BLD AUTO: 61.8 %
NRBC BLD-RTO: 0 /100 WBCS (ref 0–0)
PLATELET # BLD AUTO: 163 X10*3/UL (ref 150–450)
POTASSIUM SERPL-SCNC: 4.1 MMOL/L (ref 3.5–5.3)
PROT SERPL-MCNC: 7.8 G/DL (ref 6.4–8.2)
RBC # BLD AUTO: 5.63 X10*6/UL (ref 4–5.2)
SODIUM SERPL-SCNC: 137 MMOL/L (ref 136–145)
WBC # BLD AUTO: 5.6 X10*3/UL (ref 4.4–11.3)

## 2024-10-09 PROCEDURE — 99283 EMERGENCY DEPT VISIT LOW MDM: CPT

## 2024-10-09 PROCEDURE — 84484 ASSAY OF TROPONIN QUANT: CPT | Performed by: STUDENT IN AN ORGANIZED HEALTH CARE EDUCATION/TRAINING PROGRAM

## 2024-10-09 PROCEDURE — 2720000007 HC OR 272 NO HCPCS

## 2024-10-09 PROCEDURE — 36415 COLL VENOUS BLD VENIPUNCTURE: CPT | Performed by: STUDENT IN AN ORGANIZED HEALTH CARE EDUCATION/TRAINING PROGRAM

## 2024-10-09 PROCEDURE — 77065 DX MAMMO INCL CAD UNI: CPT | Mod: LT

## 2024-10-09 PROCEDURE — 19081 BX BREAST 1ST LESION STRTCTC: CPT | Mod: LT

## 2024-10-09 PROCEDURE — 80053 COMPREHEN METABOLIC PANEL: CPT | Performed by: STUDENT IN AN ORGANIZED HEALTH CARE EDUCATION/TRAINING PROGRAM

## 2024-10-09 PROCEDURE — 93005 ELECTROCARDIOGRAM TRACING: CPT

## 2024-10-09 PROCEDURE — 85025 COMPLETE CBC W/AUTO DIFF WBC: CPT | Performed by: STUDENT IN AN ORGANIZED HEALTH CARE EDUCATION/TRAINING PROGRAM

## 2024-10-09 RX ORDER — AMLODIPINE BESYLATE 5 MG/1
5 TABLET ORAL DAILY
Qty: 30 TABLET | Refills: 0 | Status: SHIPPED | OUTPATIENT
Start: 2024-10-09 | End: 2024-11-08

## 2024-10-09 ASSESSMENT — LIFESTYLE VARIABLES
EVER FELT BAD OR GUILTY ABOUT YOUR DRINKING: NO
HAVE PEOPLE ANNOYED YOU BY CRITICIZING YOUR DRINKING: NO
EVER HAD A DRINK FIRST THING IN THE MORNING TO STEADY YOUR NERVES TO GET RID OF A HANGOVER: NO
HAVE YOU EVER FELT YOU SHOULD CUT DOWN ON YOUR DRINKING: NO
TOTAL SCORE: 0

## 2024-10-09 ASSESSMENT — PAIN SCALES - GENERAL: PAINLEVEL_OUTOF10: 0 - NO PAIN

## 2024-10-09 ASSESSMENT — PAIN - FUNCTIONAL ASSESSMENT: PAIN_FUNCTIONAL_ASSESSMENT: 0-10

## 2024-10-09 NOTE — ED TRIAGE NOTES
Pt to ER from post op with c/o hypertension after having left breast biopsy. Pt denies headache and dizziness.

## 2024-10-09 NOTE — ED PROVIDER NOTES
HPI   Chief Complaint   Patient presents with    Hypertension     C/O high blood pressure after left breast biopsy.       This is a 59-year-old female who with past history of hypertension who takes atenolol and lisinopril for her blood pressure she takes these at night.  She presents emerged department for high blood pressure.  She states that she was at radiology getting her breast biopsy when her blood pressure in the 200s so they sent her here.  She sees that she had some numbness of her face but now it went away.  No chest pain or shortness of breath no other issues or concerns.                          No data recorded                Patient History   Past Medical History:   Diagnosis Date    Allergic rhinitis     GERD (gastroesophageal reflux disease)     Hyperlipidemia     Hypertension     IFG (impaired fasting glucose)      No past surgical history on file.  Family History   Problem Relation Name Age of Onset    Atrial fibrillation Mother      Hypertension Mother      Other (Gastric antral vascular ectasia) Mother      Hypertrophic cardiomyopathy Mother      Hypertension Father      Hypertension Sister      Hypertension Brother      Diabetes Maternal Grandmother      Breast cancer Other          maternal great aunt     Social History     Tobacco Use    Smoking status: Never    Smokeless tobacco: Never   Substance Use Topics    Alcohol use: Not Currently     Comment: occasionally    Drug use: Never       Physical Exam   ED Triage Vitals [10/09/24 1409]   Temperature Heart Rate Respirations BP   36.7 °C (98 °F) 60 20 (!) 208/85      Pulse Ox Temp Source Heart Rate Source Patient Position   100 % Tympanic -- Sitting      BP Location FiO2 (%)     Left arm --       Physical Exam  Constitutional:       General: She is not in acute distress.  HENT:      Head: Normocephalic and atraumatic.      Right Ear: Tympanic membrane normal.      Left Ear: Tympanic membrane normal.      Mouth/Throat:      Mouth: Mucous  membranes are moist.   Eyes:      Extraocular Movements: Extraocular movements intact.      Conjunctiva/sclera: Conjunctivae normal.      Pupils: Pupils are equal, round, and reactive to light.   Cardiovascular:      Rate and Rhythm: Normal rate and regular rhythm.      Heart sounds: No murmur heard.  Pulmonary:      Effort: Pulmonary effort is normal. No respiratory distress.      Breath sounds: Normal breath sounds. No stridor. No wheezing or rales.   Abdominal:      General: Bowel sounds are normal. There is no distension.      Tenderness: There is no abdominal tenderness. There is no guarding or rebound.   Musculoskeletal:         General: No swelling, tenderness or deformity. Normal range of motion.   Skin:     General: Skin is warm and dry.      Coloration: Skin is not jaundiced.      Findings: No bruising or lesion.   Neurological:      General: No focal deficit present.      Mental Status: She is alert and oriented to person, place, and time. Mental status is at baseline.      Cranial Nerves: No cranial nerve deficit.      Motor: No weakness.   Psychiatric:         Mood and Affect: Mood normal.       Labs Reviewed   CBC WITH AUTO DIFFERENTIAL - Abnormal       Result Value    WBC 5.6      nRBC 0.0      RBC 5.63 (*)     Hemoglobin 15.4      Hematocrit 47.9 (*)     MCV 85      MCH 27.4      MCHC 32.2      RDW 13.2      Platelets 163      Neutrophils % 61.8      Immature Granulocytes %, Automated 0.2      Lymphocytes % 26.3      Monocytes % 8.8      Eosinophils % 2.0      Basophils % 0.9      Neutrophils Absolute 3.45      Immature Granulocytes Absolute, Automated 0.01      Lymphocytes Absolute 1.47      Monocytes Absolute 0.49      Eosinophils Absolute 0.11      Basophils Absolute 0.05     COMPREHENSIVE METABOLIC PANEL   TROPONIN I, HIGH SENSITIVITY     No orders to display       ED Course & MDM   ED Course as of 10/09/24 1556   Wed Oct 09, 2024   1440 EKG on my read shows sinus rhythm at a rate of 56 bpm no ST  change or elevation nonischemic EKG, normal intervals. [CF]      ED Course User Index  [CF] So Barahona MD       Medical Decision Making  59-year-old female presents for department for high blood pressure.  She states she has been stressed more recently.  EKG is nonischemic, troponin is negative patient's labs show no signs of endorgan damage.  She has no focal logical deficits concerning for possible stroke.  At this time she will be given Norvasc for home and will follow-up with primary care provider she verbalized any return precautions.        Procedure  Procedures     So Barahona MD  10/09/24 1550

## 2024-10-09 NOTE — NURSING NOTE
Patient anxious prior to biopsy today. Stated she also felt flushed and anxious at provider consultation Monday, with symptoms improved to baseline at home (reports home monitoring BP). Interventions including therapeutic nursing support, distraction, and stress ball provided throughout procedure with good response. Patient completed procedure, then became presyncopal with dizziness and nausea, mildly diaphoretic. Interventions implemented to include lying supine with cold compress, coached for paced breathing technique with immediate improvement of symptoms. Hemostasis achieved at left breast puncture site with manual pressure. Bandage applied. Patient was agreeable to rest while reviewing plan of care with provider, ambulating with standby assistance related to persistent dizziness. Patient drove herself for outpatient testing today though states she may reach her family if needed. Plan of care reviewed with Dr. Hankins, requested vitals recheck. BP recheck 227/97, patient agreeable for further evaluation and escorted to Emergency by wheelchair with all belongings after notifying her family. Patient verbalized understanding after care instructions and has written copy for review.

## 2024-10-09 NOTE — DISCHARGE INSTRUCTIONS
AFTER THE TEST  A bandage will be placed over the puncture site. You may shower after 24 hours, however avoid swimming or soaking in tub for 3 days. Remove bandage after the shower or if it becomes soiled. Keep the puncture site clean/dry until resurfaced.     You may have mild discomfort at the test site. If needed, you may take Tylenol (Acetaminophen) for pain. Please avoid taking NSAIDs, Motrin, Advil, Aleve, or ibuprofen for 24 hours following the biopsy. After 24 hours you may resume NSAIDSs.      If you take aspirin, Plavix, Coumadin, Xarelto or Eliquis please tell us. If these medications were stopped by your provider, please ask them when to resume.      You may have some tenderness, bruising or slight bleeding at the site. Please apply ice packs to the site for 15 minutes on and 15 minutes off for a 2 hour minimum.      Most people can return to their usual routine after the procedure. Avoid Strenuous activity for 24 hours.      Sleep in a supportive bra the night after your biopsy. Continue to do so for comfort.      Call your provider if you have any of the following symptoms :  Fever  Increased pain  Increased bleeding  Redness  Increased swelling  Yellowish drainage    Your provider will get the biopsy results, usually within 5 - 7 days. Call your provider with any questions.

## 2024-10-10 ENCOUNTER — TELEPHONE (OUTPATIENT)
Dept: RADIOLOGY | Facility: HOSPITAL | Age: 59
End: 2024-10-10
Payer: COMMERCIAL

## 2024-10-10 NOTE — TELEPHONE ENCOUNTER
"This RN Breast Care Coordinator contacted patient for outreach s/p stereotactic biopsy yesterday. Patient states she is doing well today. Reports \"feeling better\" with BP improved today as well.  Patient declines further needs at this time. Encouraged patient to call with additional questions, concerns, or support needs before concluding our call.  "

## 2024-10-13 LAB
ATRIAL RATE: 56 BPM
P AXIS: 40 DEGREES
PR INTERVAL: 181 MS
Q ONSET: 252 MS
QRS COUNT: 9 BEATS
QRS DURATION: 96 MS
QT INTERVAL: 480 MS
QTC CALCULATION(BAZETT): 464 MS
QTC FREDERICIA: 469 MS
R AXIS: 21 DEGREES
T AXIS: 30 DEGREES
T OFFSET: 492 MS
VENTRICULAR RATE: 56 BPM

## 2024-10-15 LAB
LAB AP ASR DISCLAIMER: NORMAL
LABORATORY COMMENT REPORT: NORMAL
PATH REPORT.FINAL DX SPEC: NORMAL
PATH REPORT.GROSS SPEC: NORMAL
PATH REPORT.RELEVANT HX SPEC: NORMAL
PATH REPORT.TOTAL CANCER: NORMAL

## 2024-11-29 ENCOUNTER — LAB (OUTPATIENT)
Dept: LAB | Facility: LAB | Age: 59
End: 2024-11-29
Payer: COMMERCIAL

## 2024-11-29 DIAGNOSIS — I10 BENIGN ESSENTIAL HTN: ICD-10-CM

## 2024-11-29 DIAGNOSIS — E78.5 HYPERLIPIDEMIA, UNSPECIFIED HYPERLIPIDEMIA TYPE: ICD-10-CM

## 2024-11-29 DIAGNOSIS — R73.01 IFG (IMPAIRED FASTING GLUCOSE): ICD-10-CM

## 2024-11-29 LAB
ALBUMIN SERPL BCP-MCNC: 4.6 G/DL (ref 3.4–5)
ALP SERPL-CCNC: 61 U/L (ref 33–110)
ALT SERPL W P-5'-P-CCNC: 14 U/L (ref 7–45)
ANION GAP SERPL CALC-SCNC: 11 MMOL/L (ref 10–20)
AST SERPL W P-5'-P-CCNC: 18 U/L (ref 9–39)
BILIRUB SERPL-MCNC: 0.9 MG/DL (ref 0–1.2)
BUN SERPL-MCNC: 14 MG/DL (ref 6–23)
CALCIUM SERPL-MCNC: 9 MG/DL (ref 8.6–10.3)
CHLORIDE SERPL-SCNC: 105 MMOL/L (ref 98–107)
CHOLEST SERPL-MCNC: 156 MG/DL (ref 0–199)
CHOLESTEROL/HDL RATIO: 2.7
CO2 SERPL-SCNC: 30 MMOL/L (ref 21–32)
CREAT SERPL-MCNC: 0.68 MG/DL (ref 0.5–1.05)
EGFRCR SERPLBLD CKD-EPI 2021: >90 ML/MIN/1.73M*2
EST. AVERAGE GLUCOSE BLD GHB EST-MCNC: 108 MG/DL
GLUCOSE SERPL-MCNC: 103 MG/DL (ref 74–99)
HBA1C MFR BLD: 5.4 %
HDLC SERPL-MCNC: 58.3 MG/DL
LDLC SERPL CALC-MCNC: 75 MG/DL
NON HDL CHOLESTEROL: 98 MG/DL (ref 0–149)
POTASSIUM SERPL-SCNC: 4.6 MMOL/L (ref 3.5–5.3)
PROT SERPL-MCNC: 7.1 G/DL (ref 6.4–8.2)
SODIUM SERPL-SCNC: 141 MMOL/L (ref 136–145)
TRIGL SERPL-MCNC: 115 MG/DL (ref 0–149)
VLDL: 23 MG/DL (ref 0–40)

## 2024-11-29 PROCEDURE — 36415 COLL VENOUS BLD VENIPUNCTURE: CPT

## 2024-11-29 PROCEDURE — 80061 LIPID PANEL: CPT

## 2024-11-29 PROCEDURE — 80053 COMPREHEN METABOLIC PANEL: CPT

## 2024-11-29 PROCEDURE — 83036 HEMOGLOBIN GLYCOSYLATED A1C: CPT

## 2024-12-02 ENCOUNTER — APPOINTMENT (OUTPATIENT)
Dept: PRIMARY CARE | Facility: CLINIC | Age: 59
End: 2024-12-02
Payer: COMMERCIAL

## 2024-12-02 VITALS
DIASTOLIC BLOOD PRESSURE: 88 MMHG | HEART RATE: 69 BPM | WEIGHT: 171.8 LBS | OXYGEN SATURATION: 100 % | BODY MASS INDEX: 31.42 KG/M2 | SYSTOLIC BLOOD PRESSURE: 140 MMHG | TEMPERATURE: 96.8 F

## 2024-12-02 DIAGNOSIS — R73.01 IFG (IMPAIRED FASTING GLUCOSE): ICD-10-CM

## 2024-12-02 DIAGNOSIS — E78.5 HYPERLIPIDEMIA, UNSPECIFIED HYPERLIPIDEMIA TYPE: Primary | ICD-10-CM

## 2024-12-02 DIAGNOSIS — I10 BENIGN ESSENTIAL HTN: ICD-10-CM

## 2024-12-02 DIAGNOSIS — J30.9 ALLERGIC RHINITIS, UNSPECIFIED SEASONALITY, UNSPECIFIED TRIGGER: ICD-10-CM

## 2024-12-02 DIAGNOSIS — K21.9 GASTROESOPHAGEAL REFLUX DISEASE WITHOUT ESOPHAGITIS: ICD-10-CM

## 2024-12-02 PROCEDURE — 3079F DIAST BP 80-89 MM HG: CPT | Performed by: PHYSICIAN ASSISTANT

## 2024-12-02 PROCEDURE — 3077F SYST BP >= 140 MM HG: CPT | Performed by: PHYSICIAN ASSISTANT

## 2024-12-02 PROCEDURE — 1036F TOBACCO NON-USER: CPT | Performed by: PHYSICIAN ASSISTANT

## 2024-12-02 PROCEDURE — 99214 OFFICE O/P EST MOD 30 MIN: CPT | Performed by: PHYSICIAN ASSISTANT

## 2024-12-02 RX ORDER — PANTOPRAZOLE SODIUM 40 MG/1
40 TABLET, DELAYED RELEASE ORAL
Qty: 90 TABLET | Refills: 1 | Status: SHIPPED | OUTPATIENT
Start: 2024-12-02

## 2024-12-02 RX ORDER — SIMVASTATIN 20 MG/1
20 TABLET, FILM COATED ORAL NIGHTLY
Qty: 90 TABLET | Refills: 1 | Status: SHIPPED | OUTPATIENT
Start: 2024-12-02

## 2024-12-02 RX ORDER — AMLODIPINE BESYLATE 5 MG/1
5 TABLET ORAL DAILY
Qty: 90 TABLET | Refills: 1 | Status: SHIPPED | OUTPATIENT
Start: 2024-12-02

## 2024-12-02 RX ORDER — LISINOPRIL 20 MG/1
20 TABLET ORAL DAILY
Qty: 90 TABLET | Refills: 1 | Status: SHIPPED | OUTPATIENT
Start: 2024-12-02

## 2024-12-02 RX ORDER — FLUTICASONE PROPIONATE 50 MCG
2 SPRAY, SUSPENSION (ML) NASAL DAILY
Qty: 48 G | Refills: 1 | Status: SHIPPED | OUTPATIENT
Start: 2024-12-02

## 2024-12-02 RX ORDER — ATENOLOL 50 MG/1
50 TABLET ORAL DAILY
Qty: 90 TABLET | Refills: 1 | Status: SHIPPED | OUTPATIENT
Start: 2024-12-02

## 2024-12-02 ASSESSMENT — ENCOUNTER SYMPTOMS
ABDOMINAL PAIN: 0
SHORTNESS OF BREATH: 0

## 2024-12-02 ASSESSMENT — PATIENT HEALTH QUESTIONNAIRE - PHQ9
1. LITTLE INTEREST OR PLEASURE IN DOING THINGS: NOT AT ALL
2. FEELING DOWN, DEPRESSED OR HOPELESS: NOT AT ALL
SUM OF ALL RESPONSES TO PHQ9 QUESTIONS 1 AND 2: 0

## 2024-12-02 NOTE — PROGRESS NOTES
Subjective   Patient ID: Sunita Austin is a 59 y.o. female who presents for Hypertension (Recheck, review blood work), Hyperlipidemia, and GERD.    HPI   Patient presents for hypertension and hyperlipidemia, allergies, and GERD.     Hyperlipidemia: well controlled.  Taking and tolerating simvastatin qd and fish oil every day.    LDL Calculated (mg/dL)   Date Value   11/29/2024 75   08/02/2024 84     LDL (mg/dL)   Date Value   08/04/2023 76   12/21/2022 100 (H)     Triglycerides (mg/dL)   Date Value   11/29/2024 115   08/02/2024 182 (H)     HDL-Cholesterol (mg/dL)   Date Value   11/29/2024 58.3   08/02/2024 52.7     Hypertension:  Taking atenolol 50mg and lisinopril 20mg qd and tolerating well. Bps were high after her breast biopsy so they added taking Amlodipine 5mg every day which helped, but been off it the past month and BP is a little high off it.     Allergies doing ok with use of Flonase and Allegra.    IFG:  A1c improved to 5.4. Doing a little better with carb intake and has been more active. Doing some walking exercise.   Hemoglobin A1C (%)   Date Value   11/29/2024 5.4   08/02/2024 6.0 (H)   04/01/2024 6.1 (H)     Glucose (mg/dL)   Date Value   11/29/2024 103 (H)   10/09/2024 90     GERD:  Taking and tolerating Protonix. Reflux controlled well.     Mom has been having a lot health problems so helping care for her. Is watching her grandson too.      reports that she has never smoked. She has never used smokeless tobacco.      Review of Systems   Respiratory:  Negative for shortness of breath.    Cardiovascular:  Negative for chest pain.   Gastrointestinal:  Negative for abdominal pain.       Objective   /88   Pulse 69   Temp 36 °C (96.8 °F)   Wt 77.9 kg (171 lb 12.8 oz)   SpO2 100%   BMI 31.42 kg/m²     Physical Exam  Vitals and nursing note reviewed.   Constitutional:       Appearance: Normal appearance. She is well-developed.   Eyes:      General: No scleral icterus.  Cardiovascular:       Rate and Rhythm: Normal rate and regular rhythm.      Heart sounds: No murmur heard.  Pulmonary:      Effort: Pulmonary effort is normal.      Breath sounds: Normal breath sounds.   Abdominal:      Palpations: Abdomen is soft. There is no mass.      Tenderness: There is no abdominal tenderness.   Musculoskeletal:      Cervical back: Neck supple.   Skin:     General: Skin is warm and dry.   Neurological:      Mental Status: She is alert.       Assessment/Plan   Diagnoses and all orders for this visit:  Hyperlipidemia, unspecified hyperlipidemia type  -     simvastatin (Zocor) 20 mg tablet; Take 1 tablet (20 mg) by mouth once daily at bedtime.  -     Lipid Panel; Future  -     Comprehensive Metabolic Panel; Future  IFG (impaired fasting glucose)  -     Hemoglobin A1C; Future  -     Comprehensive Metabolic Panel; Future  Benign essential HTN  -     amLODIPine (Norvasc) 5 mg tablet; Take 1 tablet (5 mg) by mouth once daily.  -     lisinopril 20 mg tablet; Take 1 tablet (20 mg) by mouth once daily.  -     atenolol (Tenormin) 50 mg tablet; Take 1 tablet (50 mg) by mouth once daily.  -     Comprehensive Metabolic Panel; Future  Gastroesophageal reflux disease without esophagitis  -     pantoprazole (ProtoNix) 40 mg EC tablet; Take 1 tablet (40 mg) by mouth once daily in the morning. Take before meals.  Allergic rhinitis, unspecified seasonality, unspecified trigger  -     fluticasone (Flonase) 50 mcg/actuation nasal spray; Administer 2 sprays into each nostril once daily.       Review labs results.   Discussed diet and exercise, and encouraged weight loss.   Refilled meds.  Restart Rx Amlodipine 5mg every day. Monitor BP.   Will be due for mammogram in April 2025.   Follow up in 4 months for recheck HTN, hyperlipidemia, IFG, GERD with fasting labs the week before.

## 2025-02-18 ENCOUNTER — TELEPHONE (OUTPATIENT)
Dept: PRIMARY CARE | Facility: CLINIC | Age: 60
End: 2025-02-18
Payer: COMMERCIAL

## 2025-04-01 LAB
ALBUMIN SERPL-MCNC: 4.8 G/DL (ref 3.6–5.1)
ALP SERPL-CCNC: 65 U/L (ref 37–153)
ALT SERPL-CCNC: 18 U/L (ref 6–29)
ANION GAP SERPL CALCULATED.4IONS-SCNC: 9 MMOL/L (CALC) (ref 7–17)
AST SERPL-CCNC: 21 U/L (ref 10–35)
BILIRUB SERPL-MCNC: 0.8 MG/DL (ref 0.2–1.2)
BUN SERPL-MCNC: 18 MG/DL (ref 7–25)
CALCIUM SERPL-MCNC: 9.8 MG/DL (ref 8.6–10.4)
CHLORIDE SERPL-SCNC: 104 MMOL/L (ref 98–110)
CHOLEST SERPL-MCNC: 189 MG/DL
CHOLEST/HDLC SERPL: 2.9 (CALC)
CO2 SERPL-SCNC: 29 MMOL/L (ref 20–32)
CREAT SERPL-MCNC: 0.69 MG/DL (ref 0.5–1.03)
EGFRCR SERPLBLD CKD-EPI 2021: 100 ML/MIN/1.73M2
EST. AVERAGE GLUCOSE BLD GHB EST-MCNC: 117 MG/DL
EST. AVERAGE GLUCOSE BLD GHB EST-SCNC: 6.5 MMOL/L
GLUCOSE SERPL-MCNC: 106 MG/DL (ref 65–99)
HBA1C MFR BLD: 5.7 % OF TOTAL HGB
HDLC SERPL-MCNC: 65 MG/DL
LDLC SERPL CALC-MCNC: 98 MG/DL (CALC)
NONHDLC SERPL-MCNC: 124 MG/DL (CALC)
POTASSIUM SERPL-SCNC: 5 MMOL/L (ref 3.5–5.3)
PROT SERPL-MCNC: 7.2 G/DL (ref 6.1–8.1)
SODIUM SERPL-SCNC: 142 MMOL/L (ref 135–146)
TRIGL SERPL-MCNC: 166 MG/DL

## 2025-04-03 ENCOUNTER — APPOINTMENT (OUTPATIENT)
Dept: PRIMARY CARE | Facility: CLINIC | Age: 60
End: 2025-04-03
Payer: COMMERCIAL

## 2025-04-03 VITALS
HEART RATE: 68 BPM | BODY MASS INDEX: 31.46 KG/M2 | DIASTOLIC BLOOD PRESSURE: 74 MMHG | SYSTOLIC BLOOD PRESSURE: 128 MMHG | OXYGEN SATURATION: 99 % | WEIGHT: 172 LBS | TEMPERATURE: 97.4 F

## 2025-04-03 DIAGNOSIS — J30.9 ALLERGIC RHINITIS, UNSPECIFIED SEASONALITY, UNSPECIFIED TRIGGER: ICD-10-CM

## 2025-04-03 DIAGNOSIS — I10 BENIGN ESSENTIAL HTN: ICD-10-CM

## 2025-04-03 DIAGNOSIS — E78.5 HYPERLIPIDEMIA, UNSPECIFIED HYPERLIPIDEMIA TYPE: Primary | ICD-10-CM

## 2025-04-03 DIAGNOSIS — K21.9 GASTROESOPHAGEAL REFLUX DISEASE WITHOUT ESOPHAGITIS: ICD-10-CM

## 2025-04-03 DIAGNOSIS — R73.01 IFG (IMPAIRED FASTING GLUCOSE): ICD-10-CM

## 2025-04-03 PROCEDURE — 1036F TOBACCO NON-USER: CPT | Performed by: PHYSICIAN ASSISTANT

## 2025-04-03 PROCEDURE — 3078F DIAST BP <80 MM HG: CPT | Performed by: PHYSICIAN ASSISTANT

## 2025-04-03 PROCEDURE — 3074F SYST BP LT 130 MM HG: CPT | Performed by: PHYSICIAN ASSISTANT

## 2025-04-03 PROCEDURE — 99214 OFFICE O/P EST MOD 30 MIN: CPT | Performed by: PHYSICIAN ASSISTANT

## 2025-04-03 RX ORDER — SIMVASTATIN 20 MG/1
20 TABLET, FILM COATED ORAL NIGHTLY
Qty: 90 TABLET | Refills: 1 | Status: SHIPPED | OUTPATIENT
Start: 2025-04-03

## 2025-04-03 RX ORDER — LISINOPRIL 20 MG/1
20 TABLET ORAL DAILY
Qty: 90 TABLET | Refills: 1 | Status: SHIPPED | OUTPATIENT
Start: 2025-04-03

## 2025-04-03 RX ORDER — ATENOLOL 50 MG/1
50 TABLET ORAL DAILY
Qty: 90 TABLET | Refills: 1 | Status: SHIPPED | OUTPATIENT
Start: 2025-04-03

## 2025-04-03 RX ORDER — AMLODIPINE BESYLATE 5 MG/1
5 TABLET ORAL DAILY
Qty: 90 TABLET | Refills: 1 | Status: SHIPPED | OUTPATIENT
Start: 2025-04-03

## 2025-04-03 RX ORDER — FLUTICASONE PROPIONATE 50 MCG
2 SPRAY, SUSPENSION (ML) NASAL DAILY
Qty: 48 G | Refills: 1 | Status: SHIPPED | OUTPATIENT
Start: 2025-04-03

## 2025-04-03 RX ORDER — PANTOPRAZOLE SODIUM 40 MG/1
40 TABLET, DELAYED RELEASE ORAL
Qty: 90 TABLET | Refills: 1 | Status: SHIPPED | OUTPATIENT
Start: 2025-04-03

## 2025-04-03 ASSESSMENT — PATIENT HEALTH QUESTIONNAIRE - PHQ9
1. LITTLE INTEREST OR PLEASURE IN DOING THINGS: NOT AT ALL
SUM OF ALL RESPONSES TO PHQ9 QUESTIONS 1 AND 2: 0
2. FEELING DOWN, DEPRESSED OR HOPELESS: NOT AT ALL

## 2025-04-03 ASSESSMENT — ENCOUNTER SYMPTOMS
SHORTNESS OF BREATH: 0
ABDOMINAL PAIN: 0

## 2025-04-03 NOTE — PROGRESS NOTES
Subjective   Patient ID: Sunita Austin is a 59 y.o. female who presents for Hypertension, Hyperlipidemia, and GERD (Recheck, review labs.).    HPI   Patient presents for hypertension and hyperlipidemia, allergies, and GERD.     Hyperlipidemia: well controlled.  Taking and tolerating simvastatin qd and fish oil every day.    LDL-CHOLESTEROL (mg/dL (calc))   Date Value   03/31/2025 98     LDL Calculated (mg/dL)   Date Value   11/29/2024 75   08/02/2024 84     LDL (mg/dL)   Date Value   08/04/2023 76   12/21/2022 100 (H)     TRIGLYCERIDES (mg/dL)   Date Value   03/31/2025 166 (H)     Triglycerides (mg/dL)   Date Value   11/29/2024 115   08/02/2024 182 (H)     HDL CHOLESTEROL (mg/dL)   Date Value   03/31/2025 65     HDL-Cholesterol (mg/dL)   Date Value   11/29/2024 58.3   08/02/2024 52.7     Hypertension:  Controlled. Taking atenolol 50mg, lisinopril 20mg, and amlodipine 5mg qd and tolerating well.     Allergies doing ok with use of Flonase and Allegra.    IFG:  A1c allison to 5.7. Doing a little better with carb intake. Less active lately. Rarely getting some.   HEMOGLOBIN A1c (% of total Hgb)   Date Value   03/31/2025 5.7 (H)     Hemoglobin A1C (%)   Date Value   11/29/2024 5.4   08/02/2024 6.0 (H)   04/01/2024 6.1 (H)     GLUCOSE (mg/dL)   Date Value   03/31/2025 106 (H)     Glucose (mg/dL)   Date Value   11/29/2024 103 (H)   10/09/2024 90     GERD:  Taking and tolerating Protonix. Reflux controlled fairly well.     Mom has been having a lot health problems so helping care for her. Is watching her grandson too.      reports that she has never smoked. She has never used smokeless tobacco.      Review of Systems   Respiratory:  Negative for shortness of breath.    Cardiovascular:  Negative for chest pain.   Gastrointestinal:  Negative for abdominal pain.       Objective   /74   Pulse 68   Temp 36.3 °C (97.4 °F)   Wt 78 kg (172 lb)   SpO2 99%   BMI 31.46 kg/m²     Physical Exam  Vitals and nursing note  reviewed.   Constitutional:       Appearance: Normal appearance. She is well-developed.   Eyes:      General: No scleral icterus.  Cardiovascular:      Rate and Rhythm: Normal rate and regular rhythm.      Heart sounds: No murmur heard.  Pulmonary:      Effort: Pulmonary effort is normal.      Breath sounds: Normal breath sounds.   Abdominal:      Palpations: Abdomen is soft. There is no mass.      Tenderness: There is no abdominal tenderness.   Musculoskeletal:      Cervical back: Neck supple.   Skin:     General: Skin is warm and dry.   Neurological:      Mental Status: She is alert.       Assessment/Plan   Diagnoses and all orders for this visit:  Hyperlipidemia, unspecified hyperlipidemia type  -     simvastatin (Zocor) 20 mg tablet; Take 1 tablet (20 mg) by mouth once daily at bedtime.  -     Lipid Panel; Future  -     Comprehensive Metabolic Panel; Future  -     Follow Up In Primary Care; Future  IFG (impaired fasting glucose)  -     Hemoglobin A1C; Future  -     Comprehensive Metabolic Panel; Future  -     Follow Up In Primary Care; Future  Benign essential HTN  -     amLODIPine (Norvasc) 5 mg tablet; Take 1 tablet (5 mg) by mouth once daily.  -     atenolol (Tenormin) 50 mg tablet; Take 1 tablet (50 mg) by mouth once daily.  -     lisinopril 20 mg tablet; Take 1 tablet (20 mg) by mouth once daily.  -     Comprehensive Metabolic Panel; Future  -     Follow Up In Primary Care; Future  Gastroesophageal reflux disease without esophagitis  -     pantoprazole (ProtoNix) 40 mg EC tablet; Take 1 tablet (40 mg) by mouth once daily in the morning. Take before meals.  -     Follow Up In Primary Care; Future  Allergic rhinitis, unspecified seasonality, unspecified trigger  -     fluticasone (Flonase) 50 mcg/actuation nasal spray; Administer 2 sprays into each nostril once daily.       Review labs results.   Discussed diet and exercise, and encouraged weight loss.   Refilled meds.   Monitor BP.   Due for mammogram this  month and has order from Dr Perez.    Follow up in 4 months for recheck HTN, hyperlipidemia, IFG, GERD with fasting labs the week before.

## 2025-07-21 DIAGNOSIS — E78.5 HYPERLIPIDEMIA, UNSPECIFIED HYPERLIPIDEMIA TYPE: ICD-10-CM

## 2025-07-21 DIAGNOSIS — I10 BENIGN ESSENTIAL HTN: ICD-10-CM

## 2025-07-21 DIAGNOSIS — R73.01 IFG (IMPAIRED FASTING GLUCOSE): ICD-10-CM

## 2025-08-04 ENCOUNTER — APPOINTMENT (OUTPATIENT)
Dept: PRIMARY CARE | Facility: CLINIC | Age: 60
End: 2025-08-04
Payer: COMMERCIAL

## 2025-08-04 VITALS
BODY MASS INDEX: 32.01 KG/M2 | HEART RATE: 70 BPM | SYSTOLIC BLOOD PRESSURE: 122 MMHG | DIASTOLIC BLOOD PRESSURE: 84 MMHG | TEMPERATURE: 97.2 F | OXYGEN SATURATION: 99 % | WEIGHT: 175 LBS

## 2025-08-04 DIAGNOSIS — J30.9 ALLERGIC RHINITIS, UNSPECIFIED SEASONALITY, UNSPECIFIED TRIGGER: ICD-10-CM

## 2025-08-04 DIAGNOSIS — R73.01 IFG (IMPAIRED FASTING GLUCOSE): ICD-10-CM

## 2025-08-04 DIAGNOSIS — I10 BENIGN ESSENTIAL HTN: ICD-10-CM

## 2025-08-04 DIAGNOSIS — E78.5 HYPERLIPIDEMIA, UNSPECIFIED HYPERLIPIDEMIA TYPE: Primary | ICD-10-CM

## 2025-08-04 DIAGNOSIS — K21.9 GASTROESOPHAGEAL REFLUX DISEASE WITHOUT ESOPHAGITIS: ICD-10-CM

## 2025-08-04 PROCEDURE — 3079F DIAST BP 80-89 MM HG: CPT | Performed by: PHYSICIAN ASSISTANT

## 2025-08-04 PROCEDURE — 1036F TOBACCO NON-USER: CPT | Performed by: PHYSICIAN ASSISTANT

## 2025-08-04 PROCEDURE — 3074F SYST BP LT 130 MM HG: CPT | Performed by: PHYSICIAN ASSISTANT

## 2025-08-04 PROCEDURE — 99214 OFFICE O/P EST MOD 30 MIN: CPT | Performed by: PHYSICIAN ASSISTANT

## 2025-08-04 RX ORDER — FLUTICASONE PROPIONATE 50 MCG
2 SPRAY, SUSPENSION (ML) NASAL DAILY
Qty: 48 G | Refills: 1 | Status: SHIPPED | OUTPATIENT
Start: 2025-08-04

## 2025-08-04 RX ORDER — LISINOPRIL 20 MG/1
20 TABLET ORAL DAILY
Qty: 90 TABLET | Refills: 1 | Status: SHIPPED | OUTPATIENT
Start: 2025-08-04

## 2025-08-04 RX ORDER — ATENOLOL 50 MG/1
50 TABLET ORAL DAILY
Qty: 90 TABLET | Refills: 1 | Status: SHIPPED | OUTPATIENT
Start: 2025-08-04

## 2025-08-04 RX ORDER — AMLODIPINE BESYLATE 5 MG/1
5 TABLET ORAL DAILY
Qty: 90 TABLET | Refills: 1 | Status: SHIPPED | OUTPATIENT
Start: 2025-08-04

## 2025-08-04 RX ORDER — SIMVASTATIN 20 MG/1
20 TABLET, FILM COATED ORAL NIGHTLY
Qty: 90 TABLET | Refills: 1 | Status: SHIPPED | OUTPATIENT
Start: 2025-08-04

## 2025-08-04 RX ORDER — PANTOPRAZOLE SODIUM 40 MG/1
40 TABLET, DELAYED RELEASE ORAL
Qty: 90 TABLET | Refills: 1 | Status: SHIPPED | OUTPATIENT
Start: 2025-08-04

## 2025-08-04 ASSESSMENT — PATIENT HEALTH QUESTIONNAIRE - PHQ9
SUM OF ALL RESPONSES TO PHQ9 QUESTIONS 1 AND 2: 0
1. LITTLE INTEREST OR PLEASURE IN DOING THINGS: NOT AT ALL
2. FEELING DOWN, DEPRESSED OR HOPELESS: NOT AT ALL

## 2025-08-04 ASSESSMENT — ENCOUNTER SYMPTOMS
SHORTNESS OF BREATH: 0
ABDOMINAL PAIN: 0

## 2025-08-04 NOTE — PROGRESS NOTES
Subjective   Patient ID: Sunita Austin is a 59 y.o. female who presents for GERD, Hypertension, and Hyperlipidemia (Review BW).    HPI   Patient presents for hypertension and hyperlipidemia, allergies, and GERD.     Hyperlipidemia: well controlled  on 3/31/25 labs -- just had labs drawn this morning.  Taking and tolerating simvastatin qd and fish oil every day.    LDL-CHOLESTEROL (mg/dL (calc))   Date Value   03/31/2025 98     LDL Calculated (mg/dL)   Date Value   11/29/2024 75   08/02/2024 84     LDL (mg/dL)   Date Value   08/04/2023 76   12/21/2022 100 (H)     TRIGLYCERIDES (mg/dL)   Date Value   03/31/2025 166 (H)     Triglycerides (mg/dL)   Date Value   11/29/2024 115   08/02/2024 182 (H)     HDL CHOLESTEROL (mg/dL)   Date Value   03/31/2025 65     HDL-Cholesterol (mg/dL)   Date Value   11/29/2024 58.3   08/02/2024 52.7     Hypertension:  Controlled. Taking atenolol 50mg, lisinopril 20mg, and amlodipine 5mg qd and tolerating well.     Allergies doing ok with use of Flonase and Allegra.    IFG:  A1c allison to 5.7 on 3/31/25 labs -- just had labs drawn this morning.  Doing a little better with carb intake. Less active lately. Rarely getting some.   HEMOGLOBIN A1c (% of total Hgb)   Date Value   03/31/2025 5.7 (H)     Hemoglobin A1C (%)   Date Value   11/29/2024 5.4   08/02/2024 6.0 (H)   04/01/2024 6.1 (H)     GLUCOSE (mg/dL)   Date Value   03/31/2025 106 (H)     Glucose (mg/dL)   Date Value   11/29/2024 103 (H)   10/09/2024 90     GERD:  Taking and tolerating Protonix. Reflux controlled fairly well.     Mom has been having a lot health problems so helping care for her. Is watching her grandson too.      reports that she has never smoked. She has never used smokeless tobacco.      Review of Systems   Respiratory:  Negative for shortness of breath.    Cardiovascular:  Negative for chest pain.   Gastrointestinal:  Negative for abdominal pain.       Objective   /84   Pulse 70   Temp 36.2 °C (97.2 °F)   Wt  79.4 kg (175 lb)   SpO2 99%   BMI 32.01 kg/m²     Physical Exam  Vitals and nursing note reviewed.   Constitutional:       Appearance: Normal appearance. She is well-developed.     Eyes:      General: No scleral icterus.      Cardiovascular:      Rate and Rhythm: Normal rate and regular rhythm.      Heart sounds: No murmur heard.  Pulmonary:      Effort: Pulmonary effort is normal.      Breath sounds: Normal breath sounds.   Abdominal:      Palpations: Abdomen is soft. There is no mass.      Tenderness: There is no abdominal tenderness.     Musculoskeletal:      Cervical back: Neck supple.     Skin:     General: Skin is warm and dry.     Neurological:      Mental Status: She is alert.       Assessment/Plan   Diagnoses and all orders for this visit:  Hyperlipidemia, unspecified hyperlipidemia type  -     simvastatin (Zocor) 20 mg tablet; Take 1 tablet (20 mg) by mouth once daily at bedtime.  -     Lipid Panel; Future  -     Comprehensive Metabolic Panel; Future  -     Follow Up In Primary Care  IFG (impaired fasting glucose)  -     Hemoglobin A1C; Future  -     Comprehensive Metabolic Panel; Future  -     Follow Up In Primary Care  Benign essential HTN  -     amLODIPine (Norvasc) 5 mg tablet; Take 1 tablet (5 mg) by mouth once daily.  -     atenolol (Tenormin) 50 mg tablet; Take 1 tablet (50 mg) by mouth once daily.  -     lisinopril 20 mg tablet; Take 1 tablet (20 mg) by mouth once daily.  -     Comprehensive Metabolic Panel; Future  -     Follow Up In Primary Care  Gastroesophageal reflux disease without esophagitis  -     pantoprazole (ProtoNix) 40 mg EC tablet; Take 1 tablet (40 mg) by mouth once daily in the morning. Take before meals.  -     Follow Up In Primary Care  Allergic rhinitis, unspecified seasonality, unspecified trigger  -     fluticasone (Flonase) 50 mcg/actuation nasal spray; Administer 2 sprays into each nostril once daily.  Other orders  -     Follow Up In Primary Care; Future       Await  pending labs results.   Discussed diet and exercise, and encouraged weight loss.   Refilled meds.   Monitor BP.   Reminded to do mammogram as was ordered by Dr Perez earlier this year.   Follow up in 4 months for recheck HTN, hyperlipidemia, IFG, GERD with fasting labs the week before.

## 2025-08-05 LAB
ALBUMIN SERPL-MCNC: 4.9 G/DL (ref 3.6–5.1)
ALP SERPL-CCNC: 67 U/L (ref 37–153)
ALT SERPL-CCNC: 17 U/L (ref 6–29)
ANION GAP SERPL CALCULATED.4IONS-SCNC: 7 MMOL/L (CALC) (ref 7–17)
AST SERPL-CCNC: 20 U/L (ref 10–35)
BILIRUB SERPL-MCNC: 0.7 MG/DL (ref 0.2–1.2)
BUN SERPL-MCNC: 19 MG/DL (ref 7–25)
CALCIUM SERPL-MCNC: 9.6 MG/DL (ref 8.6–10.4)
CHLORIDE SERPL-SCNC: 104 MMOL/L (ref 98–110)
CHOLEST SERPL-MCNC: 184 MG/DL
CHOLEST/HDLC SERPL: 3.1 (CALC)
CO2 SERPL-SCNC: 29 MMOL/L (ref 20–32)
CREAT SERPL-MCNC: 0.74 MG/DL (ref 0.5–1.03)
EGFRCR SERPLBLD CKD-EPI 2021: 93 ML/MIN/1.73M2
EST. AVERAGE GLUCOSE BLD GHB EST-MCNC: 117 MG/DL
EST. AVERAGE GLUCOSE BLD GHB EST-SCNC: 6.5 MMOL/L
GLUCOSE SERPL-MCNC: 115 MG/DL (ref 65–99)
HBA1C MFR BLD: 5.7 %
HDLC SERPL-MCNC: 60 MG/DL
LDLC SERPL CALC-MCNC: 96 MG/DL (CALC)
NONHDLC SERPL-MCNC: 124 MG/DL (CALC)
POTASSIUM SERPL-SCNC: 5.1 MMOL/L (ref 3.5–5.3)
PROT SERPL-MCNC: 7.3 G/DL (ref 6.1–8.1)
SODIUM SERPL-SCNC: 140 MMOL/L (ref 135–146)
TRIGL SERPL-MCNC: 181 MG/DL

## 2025-12-04 ENCOUNTER — APPOINTMENT (OUTPATIENT)
Dept: PRIMARY CARE | Facility: CLINIC | Age: 60
End: 2025-12-04
Payer: COMMERCIAL